# Patient Record
Sex: FEMALE | Race: WHITE
[De-identification: names, ages, dates, MRNs, and addresses within clinical notes are randomized per-mention and may not be internally consistent; named-entity substitution may affect disease eponyms.]

---

## 2017-01-19 ENCOUNTER — HOSPITAL ENCOUNTER (EMERGENCY)
Dept: HOSPITAL 80 - FED | Age: 52
Discharge: HOME | End: 2017-01-19
Payer: COMMERCIAL

## 2017-01-19 VITALS — TEMPERATURE: 97.9 F | RESPIRATION RATE: 16 BRPM

## 2017-01-19 VITALS — DIASTOLIC BLOOD PRESSURE: 73 MMHG | SYSTOLIC BLOOD PRESSURE: 119 MMHG | OXYGEN SATURATION: 96 % | HEART RATE: 81 BPM

## 2017-01-19 DIAGNOSIS — R07.9: Primary | ICD-10-CM

## 2017-01-19 LAB
% IMMATURE GRANULYOCYTES: 0.2 % (ref 0–1.1)
ABSOLUTE IMMATURE GRANULOCYTES: 0.01 10^3/UL (ref 0–0.1)
ABSOLUTE NRBC COUNT: 0 10^3/UL (ref 0–0.01)
ADD DIFF?: NO
ADD MORPH?: NO
ADD SCAN?: NO
ANION GAP SERPL CALC-SCNC: 12 MEQ/L (ref 8–16)
ATYPICAL LYMPHOCYTE FLAG: 10 (ref 0–99)
CALCIUM SERPL-MCNC: 9.4 MG/DL (ref 8.5–10.4)
CHLORIDE SERPL-SCNC: 106 MEQ/L (ref 97–110)
CO2 SERPL-SCNC: 22 MEQ/L (ref 22–31)
CREAT SERPL-MCNC: 0.6 MG/DL (ref 0.6–1)
ERYTHROCYTE [DISTWIDTH] IN BLOOD BY AUTOMATED COUNT: 11.7 % (ref 11.5–15.2)
ERYTHROCYTE [SEDIMENTATION RATE] IN BLOOD BY WESTERGREN METHOD: 7 MM/HR (ref 0–30)
FRAGMENT RBC FLAG: 0 (ref 0–99)
GFR SERPL CREATININE-BSD FRML MDRD: > 60 ML/MIN/{1.73_M2}
GLUCOSE SERPL-MCNC: 93 MG/DL (ref 70–100)
HCT VFR BLD CALC: 41.2 % (ref 38–47)
HCT VFR BLD CALC: 41.2 % (ref 38–47)
HGB BLD-MCNC: 14.5 G/DL (ref 12.6–16.3)
LEFT SHIFT FLG: 0 (ref 0–99)
LIPEMIA HEMOLYSIS FLAG: 90 (ref 0–99)
MCH RBC BLDCO QN: 31.3 PG (ref 27.9–34.1)
MCHC RBC AUTO-ENTMCNC: 35.2 G/DL (ref 32.4–36.7)
MCV RBC AUTO: 88.8 FL (ref 81.5–99.8)
NRBC-AUTO%: 0 % (ref 0–0.2)
PLATELET # BLD: 261 10^3/UL (ref 150–400)
PLATELET CLUMPS FLAG: 0 (ref 0–99)
PMV BLD AUTO: 9.7 FL (ref 8.7–11.7)
POTASSIUM SERPL-SCNC: 3.8 MEQ/L (ref 3.5–5.2)
RBC # BLD AUTO: 4.64 10^6/UL (ref 4.18–5.33)
SODIUM SERPL-SCNC: 140 MEQ/L (ref 134–144)
TROPONIN I SERPL-MCNC: < 0.012 NG/ML (ref 0–0.03)

## 2017-01-19 NOTE — CPEKG
Heart Rate: 76

RR Interval: 789

P-R Interval: 172

QRSD Interval: 80

QT Interval: 368

QTC Interval: 414

P Axis: 82

QRS Axis: 24

T Wave Axis: 54

EKG Severity - BORDERLINE ECG -

EKG Impression: SINUS RHYTHM

EKG Impression: BORDERLINE R WAVE PROGRESSION, ANTERIOR LEADS

Electronically Signed By: Abdiel Baker 19-Jan-2017 19:46:24

## 2017-01-19 NOTE — DX
PA and Lateral Chest



Clinical Indications:  Chest pain for several days in a 51-year-old female with a history of lupus; n
o prior chest films are available for comparison.



Findings:  The lungs are clear. There is hyperexpansion seen with flattening of the hemidiaphragms no
silke. A mild pectus the bottom deformity is seen. The heart size and pulmonary vascularity are normal.
 Pleural surfaces and bony thorax are negative for acute abnormality.



Impression: Hyperexpansion is seen suggestive of airways disease with no superimposed pneumonia ident
ified.

## 2017-01-19 NOTE — EDPHY
H & P


Stated Complaint: Chest pain - left side for 7 days.


Time Seen by Provider: 17 17:06





- Personal History


LMP (Females 10-55): 15-21 Days Ago


Current Tetanus Diphtheria and Acellular Pertussis (TDAP): Yes





- Medical/Surgical History


Hx Asthma: No


Hx Chronic Respiratory Disease: No


Hx Diabetes: No


Hx Cardiac Disease: No


Hx Renal Disease: No


Hx Cirrhosis: No


Hx Alcoholism: No


Hx HIV/AIDS: No


Hx Splenectomy or Spleen Trauma: No


Other PMH: Lupus.





- Social History


Smoking Status: Never smoked


Constitutional: 


 Initial Vital Signs











Temperature (C)  36.6 C   17 16:29


 


Heart Rate  90   17 16:29


 


Respiratory Rate  16   17 16:29


 


Blood Pressure  108/61   17 16:29


 


O2 Sat (%)  98   17 16:29








 











O2 Delivery Mode               Room Air














Allergies/Adverse Reactions: 


 





gluten Allergy (Verified 17 16:33)


 











Medical Decision Making


ED Course/Re-evaluation: 


CHIEF COMPLAINT:  Chest pain





HISTORY OF PRESENT ILLNESS:  The patient is a 51 year old female with history 

of lupus, presenting with intermittent chest pain for the past 2 weeks. She has 

experienced 5 episodes of chest tightness. Last night the pain woke her from 

sleep. Her pain is unchanged with exertion. She additionally reports general 

fatigue. She denies cough, nausea, shortness of breath, or lower extremity pain 

or swelling. No recent sickness. 





REVIEW OF SYSTEMS:  





A 10 point review of systems was performed and is negative with the exception 

of the elements mentioned in the history of present illness.





PHYSICAL EXAM:  





HR, BP, O2 Sat, RR.  Temp noted


General Appearance:  Alert, well hydrated, appropriate, and non-toxic appearing.


Head:  Atraumatic without scalp tenderness or obvious injury


Eyes:  Pupils equal, round, reactive to light and accommodation, EOMI, no trauma

, no injection.


Ears:  Clear bilaterally, no perforation, normal landmarks


Nose:  Atraumatic, no rhinorrhea, clear.


Throat:  There is no erythema or exudates, no lesions, normal tonsils, mucus 

membranes moist.


Neck:  Supple, 2+ carotid upstroke, nontender, no lymphadenopathy.


Respiratory:  No retractions, no distress, no wheezes, and no accessory muscle 

use.  Lungs are clear to auscultation bilaterally.


Cardiovascular:  Regular rate and rhythm, no murmurs, rubs, or gallops. 

Bilateral carotid, radial, dorsalis pedis, and posterior tibial pulses intact. 

Good capillary refill all extremities.


Gastrointestinal:  Abdomen is soft, nontender, non-distended, no masses, no 

rebound, no guarding, no peritoneal signs.


Musculoskeletal:  Normal active ROM of all extremities, atraumatic.


Neurological:  Alert, appropriate, and interactive.  The patient has normal 

DTRs and non-focal cranial nerves, motor, sensory, and cerebellar exam.


Skin:  No rashes, good turgor, no nodules on palpation.





Past medical history: Lupus, Hashimoto thyroid disease


Past surgical history: 


Family history: Noncontributory


Social history: 





DIAGNOSTICS/PROCEDURES/CRITICAL CARE TIME:  


The 12 lead EKG was interpreted by myself. See hard copy and/or "tracemaster" 

electronic copy for interpretation: Sinus rhythm. Borderline R wave progression

, anterior leads. 





Study:  X-ray of the chest was obtained. Results: Hyperexpansion is seen 

suggestive of airways disease with no superimposed pneumonia identified. Images 

were interpreted by the radiologist, Dr. Yang.  I viewed the images myself on 

the PACS system. 





DIFFERENTIAL DIAGNOSIS:   


The differential diagnosis for the patient's chest pain included but was not 

limited to myocardial ischemia, pulmonary embolus, chest wall pain, pleural 

inflammation, and pulmonary infectious causes.





MEDICAL DECISION MAKING:  


The patient is a 51 year old female with history of lupus, presenting with 

intermittent chest pain for the past 2 weeks. Last night the pain woke her from 

sleep. She describes the pain as an aching sensation. Her EKG is normal. She 

received 324mg Aspirin. 


Plan for chest x-ray, troponin, and d-dimer. I suspect her Troponin to be 

sensitive since she has ongoing pain for several days. 


Troponin and D-dimer are normal. 





- Data Points


Laboratory Results: 


 Laboratory Results





 17 16:51 





 17 16:51 





 











  17





  16:51


 


WBC  4.51 10^3/uL





   (3.80-9.50) 


 


RBC  4.64 10^6/uL





   (4.18-5.33) 


 


Hgb  14.5 g/dL





   (12.6-16.3) 


 


Hct  41.2 %





   (38.0-47.0) 


 


MCV  88.8 fL





   (81.5-99.8) 


 


MCH  31.3 pg





   (27.9-34.1) 


 


MCHC  35.2 g/dL





   (32.4-36.7) 


 


RDW  11.7 %





   (11.5-15.2) 


 


Plt Count  261 10^3/uL





   (150-400) 


 


MPV  9.7 fL





   (8.7-11.7) 


 


Neut % (Auto)  61.9 %





   (39.3-74.2) 


 


Lymph % (Auto)  23.9 %





   (15.0-45.0) 


 


Mono % (Auto)  8.2 %





   (4.5-13.0) 


 


Eos % (Auto)  4.7 %





   (0.6-7.6) 


 


Baso % (Auto)  1.1 %





   (0.3-1.7) 


 


Nucleat RBC Rel Count  0.0 %





   (0.0-0.2) 


 


Absolute Neuts (auto)  2.79 10^3/uL





   (1.70-6.50) 


 


Absolute Lymphs (auto)  1.08 10^3/uL





   (1.00-3.00) 


 


Absolute Monos (auto)  0.37 10^3/uL





   (0.30-0.80) 


 


Absolute Eos (auto)  0.21 10^3/uL





   (0.03-0.40) 


 


Absolute Basos (auto)  0.05 10^3/uL





   (0.02-0.10) 


 


Absolute Nucleated RBC  0.00 10^3/uL





   (0-0.01) 


 


Immature Gran %  0.2 %





   (0.0-1.1) 


 


Immature Gran #  0.01 10^3/uL





   (0.00-0.10) 


 


ESR  7 MM/HR





   (0-30) 


 


D-Dimer  0.30 ug/mLFEU





   (0.00-0.50) 


 


Sodium  140 mEq/L





   (134-144) 


 


Potassium  3.8 mEq/L





   (3.5-5.2) 


 


Chloride  106 mEq/L





   () 


 


Carbon Dioxide  22 mEq/l





   (22-31) 


 


Anion Gap  12 mEq/L





   (8-16) 


 


BUN  10 mg/dL





   (7-23) 


 


Creatinine  0.6 mg/dL





   (0.6-1.0) 


 


Estimated GFR  > 60 





  


 


Glucose  93 mg/dL





   () 


 


Calcium  9.4 mg/dL





   (8.5-10.4) 


 


Troponin I  < 0.012 ng/mL





   (0-0.034) 


 


NT-Pro-B Natriuret Pep  50 pg/mL





   (0-125) 











Medications Given: 


 








Discontinued Medications





Aspirin (Aspirin)  324 mg PO EDNOW ONE


   Stop: 17 17:44


   Last Admin: 17 18:04 Dose:  324 mg


Sodium Chloride (Ns)  1,000 mls @ 0 mls/hr IV ONCE ONE


   PRN Reason: Wide Open


   Stop: 17 17:44


   Last Admin: 17 18:04 Dose:  1,000 mls








Departure





- Departure


Disposition: Home, Routine, Self-Care


Clinical Impression: 


Chest pain


Qualifiers:


 Chest pain type: unspecified Qualifier Code: (R07.9) Chest pain, unspecified


Condition: Good


Instructions:  Chest Pain (ED)


Additional Instructions: 


Call a cardiologist tomorrow to arrange a followup appointment. 


Referrals: 


Erica Harding MD [Primary Care Provider] - As per Instructions


Juan Olson MD [Medical Doctor] - As per Instructions (Cardology referral)


Report Scribed for: Abdiel Baker


Report Scribed by: Alexandra Gundersen


Date of Report: 17


Time of Report: 17:52

## 2017-07-05 ENCOUNTER — HOSPITAL ENCOUNTER (OUTPATIENT)
Dept: HOSPITAL 80 - BMCIMAGING | Age: 52
End: 2017-07-05
Attending: PHYSICIAN ASSISTANT
Payer: COMMERCIAL

## 2017-07-05 DIAGNOSIS — Z12.31: Primary | ICD-10-CM

## 2017-07-05 PROCEDURE — G0202 SCR MAMMO BI INCL CAD: HCPCS

## 2018-10-09 ENCOUNTER — HOSPITAL ENCOUNTER (OUTPATIENT)
Dept: HOSPITAL 80 - BMCIMAGING | Age: 53
End: 2018-10-09
Attending: INTERNAL MEDICINE
Payer: COMMERCIAL

## 2018-10-09 DIAGNOSIS — Z12.31: Primary | ICD-10-CM
